# Patient Record
Sex: FEMALE | Race: BLACK OR AFRICAN AMERICAN | NOT HISPANIC OR LATINO | URBAN - METROPOLITAN AREA
[De-identification: names, ages, dates, MRNs, and addresses within clinical notes are randomized per-mention and may not be internally consistent; named-entity substitution may affect disease eponyms.]

---

## 2017-07-09 ENCOUNTER — EMERGENCY (EMERGENCY)
Facility: HOSPITAL | Age: 57
LOS: 1 days | Discharge: HOME | End: 2017-07-09
Attending: EMERGENCY MEDICINE | Admitting: EMERGENCY MEDICINE
Payer: COMMERCIAL

## 2017-07-09 VITALS
RESPIRATION RATE: 16 BRPM | DIASTOLIC BLOOD PRESSURE: 93 MMHG | HEART RATE: 64 BPM | OXYGEN SATURATION: 99 % | SYSTOLIC BLOOD PRESSURE: 153 MMHG | TEMPERATURE: 98 F

## 2017-07-09 LAB
ALBUMIN SERPL ELPH-MCNC: 4.4 G/DL — SIGNIFICANT CHANGE UP (ref 3.3–5)
ALP SERPL-CCNC: 63 U/L — SIGNIFICANT CHANGE UP (ref 40–120)
ALT FLD-CCNC: 19 U/L — SIGNIFICANT CHANGE UP (ref 4–33)
APTT BLD: 26.9 SEC — LOW (ref 27.5–37.4)
AST SERPL-CCNC: 17 U/L — SIGNIFICANT CHANGE UP (ref 4–32)
BASOPHILS # BLD AUTO: 0.05 K/UL — SIGNIFICANT CHANGE UP (ref 0–0.2)
BASOPHILS NFR BLD AUTO: 0.8 % — SIGNIFICANT CHANGE UP (ref 0–2)
BILIRUB SERPL-MCNC: 0.2 MG/DL — SIGNIFICANT CHANGE UP (ref 0.2–1.2)
BUN SERPL-MCNC: 16 MG/DL — SIGNIFICANT CHANGE UP (ref 7–23)
CALCIUM SERPL-MCNC: 9.4 MG/DL — SIGNIFICANT CHANGE UP (ref 8.4–10.5)
CHLORIDE SERPL-SCNC: 101 MMOL/L — SIGNIFICANT CHANGE UP (ref 98–107)
CO2 SERPL-SCNC: 25 MMOL/L — SIGNIFICANT CHANGE UP (ref 22–31)
CREAT SERPL-MCNC: 0.68 MG/DL — SIGNIFICANT CHANGE UP (ref 0.5–1.3)
EOSINOPHIL # BLD AUTO: 0.64 K/UL — HIGH (ref 0–0.5)
EOSINOPHIL NFR BLD AUTO: 9.6 % — HIGH (ref 0–6)
GLUCOSE SERPL-MCNC: 106 MG/DL — HIGH (ref 70–99)
HCT VFR BLD CALC: 41.3 % — SIGNIFICANT CHANGE UP (ref 34.5–45)
HGB BLD-MCNC: 13.4 G/DL — SIGNIFICANT CHANGE UP (ref 11.5–15.5)
IMM GRANULOCYTES # BLD AUTO: 0.03 # — SIGNIFICANT CHANGE UP
IMM GRANULOCYTES NFR BLD AUTO: 0.5 % — SIGNIFICANT CHANGE UP (ref 0–1.5)
INR BLD: 0.91 — SIGNIFICANT CHANGE UP (ref 0.88–1.17)
LYMPHOCYTES # BLD AUTO: 1.72 K/UL — SIGNIFICANT CHANGE UP (ref 1–3.3)
LYMPHOCYTES # BLD AUTO: 25.9 % — SIGNIFICANT CHANGE UP (ref 13–44)
MCHC RBC-ENTMCNC: 32.4 % — SIGNIFICANT CHANGE UP (ref 32–36)
MCHC RBC-ENTMCNC: 32.4 PG — SIGNIFICANT CHANGE UP (ref 27–34)
MCV RBC AUTO: 100 FL — SIGNIFICANT CHANGE UP (ref 80–100)
MONOCYTES # BLD AUTO: 0.3 K/UL — SIGNIFICANT CHANGE UP (ref 0–0.9)
MONOCYTES NFR BLD AUTO: 4.5 % — SIGNIFICANT CHANGE UP (ref 2–14)
NEUTROPHILS # BLD AUTO: 3.91 K/UL — SIGNIFICANT CHANGE UP (ref 1.8–7.4)
NEUTROPHILS NFR BLD AUTO: 58.7 % — SIGNIFICANT CHANGE UP (ref 43–77)
NRBC # FLD: 0 — SIGNIFICANT CHANGE UP
PLATELET # BLD AUTO: 295 K/UL — SIGNIFICANT CHANGE UP (ref 150–400)
PMV BLD: 10.5 FL — SIGNIFICANT CHANGE UP (ref 7–13)
POTASSIUM SERPL-MCNC: 4.2 MMOL/L — SIGNIFICANT CHANGE UP (ref 3.5–5.3)
POTASSIUM SERPL-SCNC: 4.2 MMOL/L — SIGNIFICANT CHANGE UP (ref 3.5–5.3)
PROT SERPL-MCNC: 7.4 G/DL — SIGNIFICANT CHANGE UP (ref 6–8.3)
PROTHROM AB SERPL-ACNC: 10.2 SEC — SIGNIFICANT CHANGE UP (ref 9.8–13.1)
RBC # BLD: 4.13 M/UL — SIGNIFICANT CHANGE UP (ref 3.8–5.2)
RBC # FLD: 13.2 % — SIGNIFICANT CHANGE UP (ref 10.3–14.5)
SODIUM SERPL-SCNC: 141 MMOL/L — SIGNIFICANT CHANGE UP (ref 135–145)
WBC # BLD: 6.65 K/UL — SIGNIFICANT CHANGE UP (ref 3.8–10.5)
WBC # FLD AUTO: 6.65 K/UL — SIGNIFICANT CHANGE UP (ref 3.8–10.5)

## 2017-07-09 PROCEDURE — 99220: CPT

## 2017-07-09 PROCEDURE — 70450 CT HEAD/BRAIN W/O DYE: CPT | Mod: 26

## 2017-07-09 PROCEDURE — 99245 OFF/OP CONSLTJ NEW/EST HI 55: CPT

## 2017-07-09 RX ORDER — MECLIZINE HCL 12.5 MG
25 TABLET ORAL ONCE
Qty: 0 | Refills: 0 | Status: COMPLETED | OUTPATIENT
Start: 2017-07-09 | End: 2017-07-09

## 2017-07-09 RX ORDER — ASPIRIN/CALCIUM CARB/MAGNESIUM 324 MG
325 TABLET ORAL ONCE
Qty: 0 | Refills: 0 | Status: COMPLETED | OUTPATIENT
Start: 2017-07-09 | End: 2017-07-09

## 2017-07-09 RX ORDER — SODIUM CHLORIDE 9 MG/ML
1000 INJECTION INTRAMUSCULAR; INTRAVENOUS; SUBCUTANEOUS ONCE
Qty: 0 | Refills: 0 | Status: COMPLETED | OUTPATIENT
Start: 2017-07-09 | End: 2017-07-09

## 2017-07-09 RX ORDER — SODIUM CHLORIDE 9 MG/ML
1000 INJECTION INTRAMUSCULAR; INTRAVENOUS; SUBCUTANEOUS
Qty: 0 | Refills: 0 | Status: DISCONTINUED | OUTPATIENT
Start: 2017-07-09 | End: 2017-07-13

## 2017-07-09 RX ORDER — SODIUM CHLORIDE 9 MG/ML
1000 INJECTION INTRAMUSCULAR; INTRAVENOUS; SUBCUTANEOUS
Qty: 0 | Refills: 0 | Status: DISCONTINUED | OUTPATIENT
Start: 2017-07-09 | End: 2017-07-09

## 2017-07-09 RX ORDER — METOCLOPRAMIDE HCL 10 MG
10 TABLET ORAL ONCE
Qty: 0 | Refills: 0 | Status: COMPLETED | OUTPATIENT
Start: 2017-07-09 | End: 2017-07-09

## 2017-07-09 RX ADMIN — Medication 25 MILLIGRAM(S): at 16:03

## 2017-07-09 RX ADMIN — SODIUM CHLORIDE 150 MILLILITER(S): 9 INJECTION INTRAMUSCULAR; INTRAVENOUS; SUBCUTANEOUS at 21:35

## 2017-07-09 RX ADMIN — SODIUM CHLORIDE 1000 MILLILITER(S): 9 INJECTION INTRAMUSCULAR; INTRAVENOUS; SUBCUTANEOUS at 15:17

## 2017-07-09 RX ADMIN — Medication 325 MILLIGRAM(S): at 19:14

## 2017-07-09 RX ADMIN — Medication 10 MILLIGRAM(S): at 21:35

## 2017-07-09 NOTE — ED PROVIDER NOTE - CHPI ED SYMPTOMS NEG
no fever/no pain/no vomiting/no weakness/no numbness/no chills/no nausea/no tingling/no decreased eating/drinking

## 2017-07-09 NOTE — ED CDU PROVIDER NOTE - PLAN OF CARE
Rest, drink plenty of fluids  Advance activity as tolerated  Take Aspirin 81 mg daily  Take Atorvastatin 40mg once a day at bedtime  Continue all previously prescribed medications as directed  Follow up with your PMD 2-3 days- bring copies of your results  Follow up with neurology (vascular) at [address] [place] [address]- call for an appointment: [phone]  Return to the ER for worsening

## 2017-07-09 NOTE — ED CDU PROVIDER NOTE - OBJECTIVE STATEMENT
56 yo pmhx of back pain here for headache and weakness x 2 days. HA started slow, getting worse, occipital, feels weakness in the L UE and LE. Also c/o "room spinning" since this morning. Worse with laying, better with sitting up. felt "unsteady" walking this morning. No meds taken for her symptoms. States in Jan had fall and struck her head however no LOC went to ED no imaging done. IN ER was found to have  a positive ppronator drift on left side with 4/5 weakness left sided upper extremity, neuro called, wants pt to have mri/mra and send to cdu. 58 yo pmhx of back pain here for headache and weakness x 2 days. HA started slow, getting worse, occipital, feels weakness in the L UE and LE. Also c/o "room spinning" since this morning. Worse with laying, better with sitting up. felt "unsteady" walking this morning. No meds taken for her symptoms. States in Jan had fall and struck her head however no LOC went to ED no imaging done. IN ER was found to have  a positive pronator drift on left side with 4/5 weakness left sided upper extremity, neuro called, wants pt to have mri/mra and send to cdu. pt still c/o mild dizziness + left sided weakness, denies any headache, neckpain, f/c/n/v/d/chest pain, sob, abdominal pain, urinary symptoms, saddle anesthesia, loss of bowel/bladder, recent travel, sick contact, social hsitory

## 2017-07-09 NOTE — ED ADULT TRIAGE NOTE - CHIEF COMPLAINT QUOTE
Pt c/o headache and  dizziness since yesterday. L sided numbness and weakness at 530am..Denies chest pain, sob

## 2017-07-09 NOTE — ED PROVIDER NOTE - PHYSICAL EXAMINATION
NEURO: EOMi, PERRLA, visual fields intact, tongue midline, negative romberg, strength 5/5 UE and LE bilaterally, normal gait, facial expressions intact, point to point intact, rapid alternating movements intact, sensate intact to UE and LE bilaterally. NVI NEURO: EOMi, PERRLA, visual fields intact, tongue midline, negative romberg, strength 5/5 R UE and LE strength 3/5 LUE and LLE with  strength and plantar flexion. normal gait, facial expressions intact, point to point intact, rapid alternating movements intact, sensate intact to UE and LE bilaterally. NVI

## 2017-07-09 NOTE — ED PROVIDER NOTE - PROGRESS NOTE DETAILS
AAMIR ruth: neuro in ED, agree with plan for CT and likely admit to CDU for MRI/MRA. CDU PA made aware for likely admit. Pt comfortable. AAMIR ruth: neuro in ED, agree with plan for CT and likely admit to CDU for MRI/MRA, ASA ordered. CDU PA made aware for likely admit, night CDU PA will come see patient. Pt comfortable. Signed out to Dr. Pope: CDU evaluated and accepted will put in CDU under Dr. VIRGEN Lopez

## 2017-07-09 NOTE — ED CDU PROVIDER NOTE - MEDICAL DECISION MAKING DETAILS
+ pronator drift, + LUE weakness, in cdu for mri/mra, reassesment + pronator drift, + LUE weakness, in cdu for mri/mra, reassessment

## 2017-07-09 NOTE — ED CDU PROVIDER NOTE - ATTENDING CONTRIBUTION TO CARE
Dr. Wiseman Dr. Wiseman- Pt to CDU following CT negative for acute strioke; to obtain MRI and angio cebral vessels. Neurology consult reveiwed. No new neurologic findings.

## 2017-07-09 NOTE — CONSULT NOTE ADULT - ATTENDING COMMENTS
57-year-old right-handed black lady first evaluated in the CDU at [facility] on 7/10/17 with vertigo and left-sided numbness. On 7/9/17 she awoke with vertigo and also noted left-sided numbness. She works as a PCA and cares for stroke patients. She therefore knew that left-sided numbness could be a symptom of stroke. ROS otherwise negative. Exam. No nystagmus; no drift; mild give way on left but with prompting power 5/5 throughout; mildly decreased temperature sensation on left arm and leg; remainder of neurologic exam was nonfocal. MRI brain/MRA neck and head (7/10/17) to my eye was unremarkable, showing a fetal origin of the right PCA and a large left PCOM. Impression. On 7/9/17 she awoke with vertigo and also left-sided numbness. The localization of her symptoms is uncertain. Her vertigo in isolation might suggest peripheral vestibular dysfunction, which is still a possibility. The addition of her left-sided numbness, however, despite her negative MRI, suggests the possibility of a right brainstem localization, perhaps pontine, perhaps due to a tiny, MRI-negative infarct, perhaps due to small vessel disease. Suggest. Given that it is extremely difficult to prove the diagnosis one way or the other, as a precaution, obtain elective outpatient TTE; start a statin as a precaution but followup LDL as an outpatient; she should take aspirin indefinitely if no contraindication; from neurovascular standpoint, cleared for discharge.

## 2017-07-09 NOTE — CONSULT NOTE ADULT - ASSESSMENT
This is 57 year old AA woman who presents to [facility] with a new and acute 1 day onset of dizziness w/ right-sided weakness. Last know well time was 9pm 7/8/17. Patient is not a candidate for TPA due to last well known time being outside of the recommended window of 3-4.5 hours. NIHSS: 3 MRS: 0. Physical exam positive for mild horizontal nystagmus, diminished V1-V3 left side, 4/5 UE/LE left sided strength, + pronator left UE/LE, + right Mariajose-halpike. CT head is pending. Impression is dizziness 2/2 BPPV, r/o right hemispheric acute infarct vs. TIA.     Plan:   admit to CDU  CT head w/o contrast: pending   MRI brain w/o contrast  MRA head w/o contrast, neck w/ contrast   fall precautions   PT/OT   ASA 81mg PO qd + Lipitor 80mg PO qd   Permissive HTN < 220/110 This is 57 year old AA woman who presents to [facility] with a new and acute 1 day onset of dizziness w/ right-sided weakness. Last know well time was 9pm 7/8/17. Patient is not a candidate for TPA due to last well known time being outside of the recommended window of 3-4.5 hours. NIHSS: 3 MRS: 0. Physical exam positive for mild horizontal nystagmus, diminished V1-V3 left side, 4/5 UE/LE left sided strength, + pronator left UE/LE, + right Mariajose-halpike. CT head is pending. Impression is dizziness 2/2 BPPV, r/o right hemispheric acute infarct vs. TIA.     Plan:   admit to CDU  Epley maneuver performed in ED   CT head w/o contrast: pending   MRI brain w/o contrast  MRA head w/o contrast, neck w/ contrast   fall precautions   PT/OT   ASA 81mg PO qd + Lipitor 80mg PO qd   Permissive HTN < 220/110

## 2017-07-09 NOTE — ED ADULT NURSE NOTE - OBJECTIVE STATEMENT
Patient received to room 10 awake, alert, oriented x3, able to make needs known verbally.  Patient complaining of posterior headache pain.  Patient complained of having intermittent dizziness, nausea, and left shoulder weakness.  No changes in vision or speech reported.  Patient ambulatory free from injury.  Daughter at bedside for emotinal support.  EKG performed.  Vital signs recorded, hemodynamically stable.  No sign of acute distress noted, will continue to monitor.

## 2017-07-09 NOTE — ED CDU PROVIDER NOTE - PHYSICAL EXAMINATION
NEURO: EOMi, PERRLA, visual fields intact, tongue midline, negative romberg, strength 5/5 R UE and LE strength 3/5 LUE and LLE with  strength and plantar flexion. normal gait, facial expressions intact, point to point intact, rapid alternating movements intact, sensate intact to UE and LE bilaterally. NVI

## 2017-07-09 NOTE — ED CDU PROVIDER NOTE - CHPI ED SYMPTOMS NEG
no chills/no decreased eating/drinking/no fever/no numbness/no tingling/no vomiting/no nausea/no weakness/no pain

## 2017-07-09 NOTE — CONSULT NOTE ADULT - SUBJECTIVE AND OBJECTIVE BOX
Neurology Consult    Name: JAVIER VÁSQUEZ    HPI: This is 57 year old AA woman w/ a PMH of lumbago s/p mechanical fall 2/17, who presents to [facility] with a new and acute 1 day onset of dizziness w/ right-sided weakness. Last know well time was 9pm 7/8/17. ROS also revealed chronic occipital headache s/p mechanical fall 2/17, otherwise unremarkable for LOC, chest pain, N/V, or tingling. Patient is not a candidate for TPA due to last well known time being outside of the recommended window of 3-4.5 hours.   NIHSS: 3 MRS: 0     MEDICATIONS  (HOME): pending     Allergies: No Known Allergies    Objective:   Vital Signs Last 24 Hrs  T(C): 36.5 (09 Jul 2017 12:41), Max: 36.5 (09 Jul 2017 12:41)  T(F): 97.7 (09 Jul 2017 12:41), Max: 97.7 (09 Jul 2017 12:41)  HR: 68 (09 Jul 2017 15:17) (63 - 68)  BP: 172/83 (09 Jul 2017 15:17) (153/93 - 172/83)  RR: 18 (09 Jul 2017 15:17) (16 - 18)  SpO2: 98% (09 Jul 2017 15:17) (98% - 99%)    General Exam:   General appearance: well-developed, mild acute distress                   Neurological Exam:  Mental Status: AAOx3, fluent speech, follows commands    Cranial Nerves: EOMI w/ mild horizontal nystagmus with right horizontal gaze, pupils are 3mm b/l mildly reactive to light and accomodation. V1-V3 sensory diminished on the left vs. normal right, facial symmetry intact, no dysarthria, tongue midline. [place]-chiqu[place] pike + on the right.     Motor: 4/5 left UE/LE vs. 5/5 right UE/LE. + pronator drift left UE/LE.     Sensation: Intact to LT throughout    Coordination: FTN intact b/l    Reflexes: Babinski is absent b/l (toes down-going)     Gait: deferred     Labs:    07-09    141  |  101  |  16  ----------------------------<  106<H>  4.2   |  25  |  0.68    Ca    9.4      09 Jul 2017 13:57    TPro  7.4  /  Alb  4.4  /  TBili  0.2  /  DBili  x   /  AST  17  /  ALT  19  /  AlkPhos  63  07-09    LIVER FUNCTIONS - ( 09 Jul 2017 13:57 )  Alb: 4.4 g/dL / Pro: 7.4 g/dL / ALK PHOS: 63 u/L / ALT: 19 u/L / AST: 17 u/L / GGT: x           CBC Full  -  ( 09 Jul 2017 13:57 )  WBC Count : 6.65 K/uL  Hemoglobin : 13.4 g/dL  Hematocrit : 41.3 %  Platelet Count - Automated : 295 K/uL  Mean Cell Volume : 100.0 fL  Mean Cell Hemoglobin : 32.4 pg  Mean Cell Hemoglobin Concentration : 32.4 %  Auto Neutrophil # : 3.91 K/uL  Auto Lymphocyte # : 1.72 K/uL  Auto Monocyte # : 0.30 K/uL  Auto Eosinophil # : 0.64 K/uL  Auto Basophil # : 0.05 K/uL  Auto Neutrophil % : 58.7 %  Auto Lymphocyte % : 25.9 %  Auto Monocyte % : 4.5 %  Auto Eosinophil % : 9.6 %  Auto Basophil % : 0.8 %      Radiology      Neurology Consult    Name  JAVIER VÁSQUEZ    HPI:      Interval History -        Subjective:      MEDICATIONS  (STANDING):    MEDICATIONS  (PRN):      Allergies    No Known Allergies    Intolerances        Objective:   Vital Signs Last 24 Hrs  T(C): 36.5 (09 Jul 2017 12:41), Max: 36.5 (09 Jul 2017 12:41)  T(F): 97.7 (09 Jul 2017 12:41), Max: 97.7 (09 Jul 2017 12:41)  HR: 68 (09 Jul 2017 15:17) (63 - 68)  BP: 172/83 (09 Jul 2017 15:17) (153/93 - 172/83)  BP(mean): --  RR: 18 (09 Jul 2017 15:17) (16 - 18)  SpO2: 98% (09 Jul 2017 15:17) (98% - 99%)    General Exam:   General appearance: No acute distress                   Neurological Exam:  Mental Status: AAOx3, fluent speech, follows commands    Cranial Nerves: EOMI, PERRL, V1-V3 intact, facial symmetry intact, no dysarthria, tongue midline, VFF    Motor: 5/5 throughout. No drift x4    Sensation: Intact to LT throughout    Coordination: FTN intact b/l    Reflexes: 1+ bilateral biceps, brachioradialis, patellar and ankle      Gait: normal and stable.      Labs:    07-09    141  |  101  |  16  ----------------------------<  106<H>  4.2   |  25  |  0.68    Ca    9.4      09 Jul 2017 13:57    TPro  7.4  /  Alb  4.4  /  TBili  0.2  /  DBili  x   /  AST  17  /  ALT  19  /  AlkPhos  63  07-09    LIVER FUNCTIONS - ( 09 Jul 2017 13:57 )  Alb: 4.4 g/dL / Pro: 7.4 g/dL / ALK PHOS: 63 u/L / ALT: 19 u/L / AST: 17 u/L / GGT: x           CBC Full  -  ( 09 Jul 2017 13:57 )  WBC Count : 6.65 K/uL  Hemoglobin : 13.4 g/dL  Hematocrit : 41.3 %  Platelet Count - Automated : 295 K/uL  Mean Cell Volume : 100.0 fL  Mean Cell Hemoglobin : 32.4 pg  Mean Cell Hemoglobin Concentration : 32.4 %  Auto Neutrophil # : 3.91 K/uL  Auto Lymphocyte # : 1.72 K/uL  Auto Monocyte # : 0.30 K/uL  Auto Eosinophil # : 0.64 K/uL  Auto Basophil # : 0.05 K/uL  Auto Neutrophil % : 58.7 %  Auto Lymphocyte % : 25.9 %  Auto Monocyte % : 4.5 %  Auto Eosinophil % : 9.6 %  Auto Basophil % : 0.8 %      Radiology    CT w/o head: pending Neurology Consult    Name: JAVIER VÁSQUEZ    HPI: This is 57 year old AA woman w/ a PMH of lumbago s/p mechanical fall 2/17, who presents to [facility] with a new and acute 1 day onset of dizziness w/ right-sided weakness. Last know well time was 9pm 7/8/17. ROS also revealed chronic occipital headache s/p mechanical fall 2/17, otherwise unremarkable for LOC, chest pain, N/V, or tingling. Patient is not a candidate for TPA due to last well known time being outside of the recommended window of 3-4.5 hours.   NIHSS: 3 MRS: 0     MEDICATIONS  (HOME): pending     Allergies: No Known Allergies    Objective:   Vital Signs Last 24 Hrs  T(C): 36.5 (09 Jul 2017 12:41), Max: 36.5 (09 Jul 2017 12:41)  T(F): 97.7 (09 Jul 2017 12:41), Max: 97.7 (09 Jul 2017 12:41)  HR: 68 (09 Jul 2017 15:17) (63 - 68)  BP: 172/83 (09 Jul 2017 15:17) (153/93 - 172/83)  RR: 18 (09 Jul 2017 15:17) (16 - 18)  SpO2: 98% (09 Jul 2017 15:17) (98% - 99%)    General Exam:   General appearance: well-developed, mild acute distress                   Neurological Exam:  Mental Status: AAOx3, fluent speech, follows commands    Cranial Nerves: EOMI w/ mild horizontal nystagmus with right horizontal gaze, pupils are 3mm b/l mildly reactive to light and accomodation. V1-V3 sensory diminished on the left vs. normal right, facial symmetry intact, no dysarthria, tongue midline. [place]-chiuqita pike + on the right.     Motor: 4/5 left UE/LE vs. 5/5 right UE/LE. + pronator drift left UE/LE.     Sensation: Intact to LT throughout    Coordination: FTN intact b/l    Reflexes: Babinski is absent b/l (toes down-going)     Gait: deferred     Labs:    07-09    141  |  101  |  16  ----------------------------<  106<H>  4.2   |  25  |  0.68    Ca    9.4      09 Jul 2017 13:57    TPro  7.4  /  Alb  4.4  /  TBili  0.2  /  DBili  x   /  AST  17  /  ALT  19  /  AlkPhos  63  07-09    LIVER FUNCTIONS - ( 09 Jul 2017 13:57 )  Alb: 4.4 g/dL / Pro: 7.4 g/dL / ALK PHOS: 63 u/L / ALT: 19 u/L / AST: 17 u/L / GGT: x           CBC Full  -  ( 09 Jul 2017 13:57 )  WBC Count : 6.65 K/uL  Hemoglobin : 13.4 g/dL  Hematocrit : 41.3 %  Platelet Count - Automated : 295 K/uL  Mean Cell Volume : 100.0 fL  Mean Cell Hemoglobin : 32.4 pg  Mean Cell Hemoglobin Concentration : 32.4 %  Auto Neutrophil # : 3.91 K/uL  Auto Lymphocyte # : 1.72 K/uL  Auto Monocyte # : 0.30 K/uL  Auto Eosinophil # : 0.64 K/uL  Auto Basophil # : 0.05 K/uL  Auto Neutrophil % : 58.7 %  Auto Lymphocyte % : 25.9 %  Auto Monocyte % : 4.5 %  Auto Eosinophil % : 9.6 %  Auto Basophil % : 0.8 %      Radiology      Neurology Consult    Name  JAVIER VÁSQUEZ    HPI:      Interval History -        Subjective:      MEDICATIONS  (STANDING):    MEDICATIONS  (PRN):      Allergies    No Known Allergies    Intolerances        Objective:   Vital Signs Last 24 Hrs  T(C): 36.5 (09 Jul 2017 12:41), Max: 36.5 (09 Jul 2017 12:41)  T(F): 97.7 (09 Jul 2017 12:41), Max: 97.7 (09 Jul 2017 12:41)  HR: 68 (09 Jul 2017 15:17) (63 - 68)  BP: 172/83 (09 Jul 2017 15:17) (153/93 - 172/83)  BP(mean): --  RR: 18 (09 Jul 2017 15:17) (16 - 18)  SpO2: 98% (09 Jul 2017 15:17) (98% - 99%)    General Exam:   General appearance: No acute distress                   Labs:    07-09    141  |  101  |  16  ----------------------------<  106<H>  4.2   |  25  |  0.68    Ca    9.4      09 Jul 2017 13:57    TPro  7.4  /  Alb  4.4  /  TBili  0.2  /  DBili  x   /  AST  17  /  ALT  19  /  AlkPhos  63  07-09    LIVER FUNCTIONS - ( 09 Jul 2017 13:57 )  Alb: 4.4 g/dL / Pro: 7.4 g/dL / ALK PHOS: 63 u/L / ALT: 19 u/L / AST: 17 u/L / GGT: x           CBC Full  -  ( 09 Jul 2017 13:57 )  WBC Count : 6.65 K/uL  Hemoglobin : 13.4 g/dL  Hematocrit : 41.3 %  Platelet Count - Automated : 295 K/uL  Mean Cell Volume : 100.0 fL  Mean Cell Hemoglobin : 32.4 pg  Mean Cell Hemoglobin Concentration : 32.4 %  Auto Neutrophil # : 3.91 K/uL  Auto Lymphocyte # : 1.72 K/uL  Auto Monocyte # : 0.30 K/uL  Auto Eosinophil # : 0.64 K/uL  Auto Basophil # : 0.05 K/uL  Auto Neutrophil % : 58.7 %  Auto Lymphocyte % : 25.9 %  Auto Monocyte % : 4.5 %  Auto Eosinophil % : 9.6 %  Auto Basophil % : 0.8 %      Radiology    CT w/o head: pending

## 2017-07-09 NOTE — ED PROVIDER NOTE - ATTENDING CONTRIBUTION TO CARE
58 yo female c/o headache on vertex of head and vertigo associated with sudden movements, okay when laying still. Denies focal weakness, vomiting or ataxia. Exam 5/5 motor, no CN deficits, F-N, YOUSUF well done, head position change precipitants vertigo neg pronator drift. Imp:vertigo; headache. CT if negative dx BPV meclizine,

## 2017-07-09 NOTE — ED PROVIDER NOTE - OBJECTIVE STATEMENT
56 yo pmhx of back pain here for headache and weakness x 2 days. HA started slow, getting worse, occipital, feels weakness in the L UE and LE. Also c/o "room spinning" since this morning. Worse with laying, better with sitting up. felt "unsteady" walking this morning. No meds taken. States in Jan had fall and struck her head however no LOC went to ED no imaging done.  Denies fever chills vomiting CP SOB cough congestion numbness tingling

## 2017-07-09 NOTE — ED PROVIDER NOTE - MEDICAL DECISION MAKING DETAILS
58 yo F here for dizziness, weakness x 2 days. 56 yo F here for dizziness, weakness x 2 days. PLAN labs, urine, CT

## 2017-07-10 VITALS
DIASTOLIC BLOOD PRESSURE: 64 MMHG | OXYGEN SATURATION: 99 % | TEMPERATURE: 98 F | RESPIRATION RATE: 16 BRPM | SYSTOLIC BLOOD PRESSURE: 150 MMHG | HEART RATE: 62 BPM

## 2017-07-10 LAB — HBA1C BLD-MCNC: 5.9 % — HIGH (ref 4–5.6)

## 2017-07-10 PROCEDURE — 99217: CPT

## 2017-07-10 PROCEDURE — 70548 MR ANGIOGRAPHY NECK W/DYE: CPT | Mod: 26

## 2017-07-10 PROCEDURE — 70551 MRI BRAIN STEM W/O DYE: CPT | Mod: 26

## 2017-07-10 RX ORDER — ATORVASTATIN CALCIUM 80 MG/1
1 TABLET, FILM COATED ORAL
Qty: 28 | Refills: 0 | OUTPATIENT
Start: 2017-07-10 | End: 2017-08-07

## 2023-03-02 NOTE — ED CDU PROVIDER NOTE - CLINICAL SIGNS
PAST SURGICAL HISTORY:  H/O gynecological procedure     History of hysteroscopy      Kernig's sign negative/Brudzinski's sign negative

## 2023-11-13 NOTE — ED CDU PROVIDER NOTE - PROGRESS NOTE DETAILS
AAMIR ruth: neuro in ED, agree with plan for CT and likely admit to CDU for MRI/MRA, ASA ordered. CDU PA made aware for likely admit, night CDU PA will come see patient. Pt comfortable. Signed out to Dr. Pope: CDU evaluated and accepted will put in CDU under Dr. VIRGEN Lopez Clindamycin Pregnancy And Lactation Text: This medication can be used in pregnancy if certain situations. Clindamycin is also present in breast milk. CDU Attending note: evaluated this AM, pt put in CDU for MRI for vertigo with drift; this morning feels better; states able to walk to the bathroom with no difficulty; no focal weakness (did have numbness yesterday on left side); pending MRI this AM  PE: well appearing; VSS: CTAB/L; s1 s2 no m/r/g Neuro: CNs intact 5/5 motor UE and LE; FTN wnl;    I have personally performed a face to face diagnostic evaluation on this patient. I have reviewed the PA note for the day listed above and agree with the history, exam, and plan of care, except as noted. CDU PA Baseil: Pt seen and examined this morning with attending. Pt currently asymptomatic, ambulating without difficulty. Symptoms of weakness/numbness have dissipated, awaiting MRI. Will continue to monitor and observe CDU Attending note (Dr. Lopez): evaluated this AM, pt put in CDU for MRI for vertigo with drift; this morning feels better; states able to walk to the bathroom with no difficulty; no focal weakness (did have numbness yesterday on left side); pending MRI this AM  PE: well appearing; VSS: CTAB/L; s1 s2 no m/r/g Neuro: CNs intact 5/5 motor UE and LE; FTN wnl;    I have personally performed a face to face diagnostic evaluation on this patient. I have reviewed the PA note for the day listed above and agree with the history, exam, and plan of care, except as noted. AAMIR Samuels: Spoke with neurology- believed to be an MRI negative CVA. Recommend ASA 81 mg, atorvastatin 40mg QD, and f/u with vascular at 611. Will dc home with close followup. Pt amenable to plan and stable for dc home. CDU DISCHARGE NOTE (Dr. Lopez): MRI negative; spoke with neurologist who feels MRI negative CVA and pt should be discharged on ASA and statin and to follow up with vascular; pt stable for d/c

## 2023-11-27 PROBLEM — Z00.00 ENCOUNTER FOR PREVENTIVE HEALTH EXAMINATION: Status: ACTIVE | Noted: 2023-11-27

## 2023-12-07 ENCOUNTER — APPOINTMENT (OUTPATIENT)
Dept: ORTHOPEDIC SURGERY | Facility: CLINIC | Age: 63
End: 2023-12-07
Payer: COMMERCIAL

## 2023-12-07 ENCOUNTER — NON-APPOINTMENT (OUTPATIENT)
Age: 63
End: 2023-12-07

## 2023-12-07 VITALS — BODY MASS INDEX: 25.71 KG/M2 | HEIGHT: 66 IN | WEIGHT: 160 LBS

## 2023-12-07 PROCEDURE — 73552 X-RAY EXAM OF FEMUR 2/>: CPT | Mod: RT

## 2023-12-07 PROCEDURE — 99203 OFFICE O/P NEW LOW 30 MIN: CPT

## 2024-04-15 ENCOUNTER — APPOINTMENT (OUTPATIENT)
Dept: ORTHOPEDIC SURGERY | Facility: CLINIC | Age: 64
End: 2024-04-15

## 2024-04-29 ENCOUNTER — APPOINTMENT (OUTPATIENT)
Dept: ORTHOPEDIC SURGERY | Facility: CLINIC | Age: 64
End: 2024-04-29
Payer: COMMERCIAL

## 2024-04-29 DIAGNOSIS — S72.91XA UNSPECIFIED FRACTURE OF RIGHT FEMUR, INITIAL ENCOUNTER FOR CLOSED FRACTURE: ICD-10-CM

## 2024-04-29 PROCEDURE — 99213 OFFICE O/P EST LOW 20 MIN: CPT

## 2024-04-29 NOTE — ADDENDUM
[FreeTextEntry1] :  I, Ramona Salcido wrote this note acting as a scribe for Dr. Jorge Randall on Apr 29, 2024.

## 2024-04-29 NOTE — END OF VISIT
[FreeTextEntry3] :  All medical record entries made by the Scribe were at my,  Dr. Jorge Randall MD., direction and personally dictated by me on 04/29/2024. I have personally reviewed the chart and agree that the record accurately reflects my personal performance of the history, physical exam, assessment and plan.

## 2024-04-29 NOTE — HISTORY OF PRESENT ILLNESS
[de-identified] : Patient is a 64 y/o F that presents today for right femur fracture.  She tripped and fell in her home in August 23rd, 2023.  She went to Urgent Care, and she was told that she has a nondisplaced femur fracture.  She followed up with an orthopedist who recommended surgery, but she states that he was a quack.  She was very unhappy with his care.  She has not had xrays for 2 months.  She is present for another opinion.   Today, Apr 29, 2024 patient is here for a follow up and further evaluation. She is requesting PT script and states that the pain has decreased.

## 2024-04-29 NOTE — PHYSICAL EXAM
[de-identified] : Patient is WDWN, alert, and in no acute distress. Breathing is unlabored. She is grossly oriented to person, place, and time.    Right Hip:  Mild Tenderness  No edema or ecchymosis  ROM is fairly good with minimal pain  [de-identified] :  AP, lateral and oblique views of the right femur were obtained today and revealed a healed hairline Intertrochanteric fracture without displacement.

## 2024-04-29 NOTE — DISCUSSION/SUMMARY
[de-identified] : The underlying pathophysiology was reviewed with the patient. XR films were reviewed with the patient. Discussed at length the nature of the patient's condition. Their left femur pain symptoms appear secondary to Intertrochanteric Fracture.   Patient informed that her fracture is now healed. She was advised to use a cane and walker until the patient is completely comfortable walking without it. Patient may take Tylenol and Advil for pain management as necessary.   The patient was encouraged to take Calcium Citrate, Vitamin D3 and Vitamin C to promote bone health and healing.  The patient wishes to proceed with physical therapy for Right Femur ROM and strengthening. A script was given.  Patient can continue activities as tolerated. All questions answered, understanding verbalized. Patient in agreement with plan of care. Patient may follow up as needed.

## 2024-11-14 ENCOUNTER — TRANSCRIPTION ENCOUNTER (OUTPATIENT)
Age: 64
End: 2024-11-14

## 2024-11-18 ENCOUNTER — NON-APPOINTMENT (OUTPATIENT)
Age: 64
End: 2024-11-18

## 2024-11-18 ENCOUNTER — TRANSCRIPTION ENCOUNTER (OUTPATIENT)
Age: 64
End: 2024-11-18

## 2024-12-03 ENCOUNTER — NON-APPOINTMENT (OUTPATIENT)
Age: 64
End: 2024-12-03

## 2024-12-06 DIAGNOSIS — S72.142D DISPLACED INTERTROCHANTERIC FRACTURE OF LEFT FEMUR, SUBSEQUENT ENCOUNTER FOR CLOSED FRACTURE WITH ROUTINE HEALING: ICD-10-CM

## 2024-12-16 ENCOUNTER — APPOINTMENT (OUTPATIENT)
Dept: ORTHOPEDIC SURGERY | Facility: CLINIC | Age: 64
End: 2024-12-16
Payer: MEDICARE

## 2024-12-16 VITALS — HEIGHT: 66 IN | WEIGHT: 160 LBS | BODY MASS INDEX: 25.71 KG/M2

## 2024-12-16 DIAGNOSIS — S72.142D DISPLACED INTERTROCHANTERIC FRACTURE OF LEFT FEMUR, SUBSEQUENT ENCOUNTER FOR CLOSED FRACTURE WITH ROUTINE HEALING: ICD-10-CM

## 2024-12-16 PROCEDURE — 99024 POSTOP FOLLOW-UP VISIT: CPT

## 2024-12-16 PROCEDURE — 73502 X-RAY EXAM HIP UNI 2-3 VIEWS: CPT

## 2024-12-16 RX ORDER — TRAMADOL HYDROCHLORIDE 50 MG/1
50 TABLET, COATED ORAL
Qty: 40 | Refills: 0 | Status: ACTIVE | COMMUNITY
Start: 2024-12-16 | End: 1900-01-01

## 2025-04-14 ENCOUNTER — APPOINTMENT (OUTPATIENT)
Dept: ORTHOPEDIC SURGERY | Facility: CLINIC | Age: 65
End: 2025-04-14
Payer: MEDICARE

## 2025-04-14 DIAGNOSIS — S72.142D DISPLACED INTERTROCHANTERIC FRACTURE OF LEFT FEMUR, SUBSEQUENT ENCOUNTER FOR CLOSED FRACTURE WITH ROUTINE HEALING: ICD-10-CM

## 2025-04-14 DIAGNOSIS — S72.91XA UNSPECIFIED FRACTURE OF RIGHT FEMUR, INITIAL ENCOUNTER FOR CLOSED FRACTURE: ICD-10-CM

## 2025-04-14 PROCEDURE — 99213 OFFICE O/P EST LOW 20 MIN: CPT

## 2025-04-14 PROCEDURE — 73502 X-RAY EXAM HIP UNI 2-3 VIEWS: CPT | Mod: LT

## 2025-07-14 ENCOUNTER — APPOINTMENT (OUTPATIENT)
Dept: ORTHOPEDIC SURGERY | Facility: CLINIC | Age: 65
End: 2025-07-14
Payer: MEDICARE

## 2025-07-14 VITALS — HEIGHT: 66 IN | WEIGHT: 172 LBS | BODY MASS INDEX: 27.64 KG/M2

## 2025-07-14 PROCEDURE — 99213 OFFICE O/P EST LOW 20 MIN: CPT

## 2025-07-14 PROCEDURE — 73502 X-RAY EXAM HIP UNI 2-3 VIEWS: CPT
